# Patient Record
Sex: MALE | HISPANIC OR LATINO
[De-identification: names, ages, dates, MRNs, and addresses within clinical notes are randomized per-mention and may not be internally consistent; named-entity substitution may affect disease eponyms.]

---

## 2021-04-26 ENCOUNTER — DASHBOARD ENCOUNTERS (OUTPATIENT)
Age: 51
End: 2021-04-26

## 2021-04-26 ENCOUNTER — OFFICE VISIT (OUTPATIENT)
Dept: URBAN - METROPOLITAN AREA CLINIC 82 | Facility: CLINIC | Age: 51
End: 2021-04-26

## 2021-04-26 VITALS
DIASTOLIC BLOOD PRESSURE: 79 MMHG | TEMPERATURE: 97.5 F | HEIGHT: 68 IN | BODY MASS INDEX: 34.86 KG/M2 | SYSTOLIC BLOOD PRESSURE: 125 MMHG | HEART RATE: 69 BPM | RESPIRATION RATE: 16 BRPM | WEIGHT: 230 LBS

## 2021-04-26 DIAGNOSIS — R16.0 HEPATOMEGALY: ICD-10-CM

## 2021-04-26 DIAGNOSIS — R74.01 ALT (SGPT) LEVEL RAISED: ICD-10-CM

## 2021-04-26 DIAGNOSIS — E66.9 OBESITY (BMI 30.0-34.9): ICD-10-CM

## 2021-04-26 PROBLEM — 80515008: Status: ACTIVE | Noted: 2021-04-26

## 2021-04-26 PROBLEM — 166603001: Status: ACTIVE | Noted: 2021-04-26

## 2021-04-26 PROBLEM — 443371000124107: Status: ACTIVE | Noted: 2021-04-26

## 2021-04-26 PROCEDURE — 99204 OFFICE O/P NEW MOD 45 MIN: CPT | Performed by: INTERNAL MEDICINE

## 2021-04-26 NOTE — PHYSICAL EXAM GASTROINTESTINAL
obese,normal bowel sounds , no masses palpable , no guarding or rigidity , soft, nontender, nondistended

## 2021-04-26 NOTE — HPI-TODAY'S VISIT:
The patient was referred by CHAYA gracia given abnormal LFT's .   A copy of this document is being forwarded to the referring provider.

## 2021-04-26 NOTE — HPI-TODAY'S VISIT:
49 y/o  Man with obesity, HLD,Gout that was refer here due to abnormal LFT's and GGT in the setting of alcohol abuse.Patient drinks 3-4 beers daily. He said he cut down lately after nov labs were abnormal.Must recent labs from 4/2021 still with elevated GGT and ALT,AST in the 90 range. Abd U/S from 11/2020 with hepatomegaly which is a early change of inflammation secondary to alcohol. Liver synthetic function is preserve. Patient plat count is normal. So he had some time to prevent further damage and cirrhosis if he remain sober. This was explained to him in detail. He had FOBT negative last year, he prefer that annualy for colon cancer screening given no health insurance.

## 2021-11-17 ENCOUNTER — OFFICE VISIT (OUTPATIENT)
Dept: URBAN - METROPOLITAN AREA CLINIC 115 | Facility: CLINIC | Age: 51
End: 2021-11-17